# Patient Record
Sex: FEMALE | Race: WHITE | URBAN - METROPOLITAN AREA
[De-identification: names, ages, dates, MRNs, and addresses within clinical notes are randomized per-mention and may not be internally consistent; named-entity substitution may affect disease eponyms.]

---

## 2021-12-01 ENCOUNTER — HISTORICAL (OUTPATIENT)
Dept: ADMINISTRATIVE | Facility: HOSPITAL | Age: 56
End: 2021-12-01

## 2022-04-30 NOTE — ED PROVIDER NOTES
Patient:   Chandrika Corley            MRN: [id number]            FIN: [id number]               Age:   56 years     Sex:  Female     :  1965   Associated Diagnoses:   Acute bronchitis; Diarrhea; Hypokalemia   Author:   Zac Muniz MD      Basic Information   Time seen: Date & time 2021 13:17:00.   History source: Patient.   Arrival mode: Private vehicle, wheelchair.   History limitation: None.   Additional information: Patient's physician(s): Gen Wilder MD, Chief Complaint from Nursing Triage Note : Chief Complaint   2021 13:08 CST      Chief Complaint           POV suspected flu+ from Dr. Cruz, was not actually tested. SOB present, generalized body aches. Some diarrhea present. Sent by Dr. Cruz  .      History of Present Illness   The patient presents with 55 yo female presetns with cough, congestion and bodyaches for the past week. Complains of SOB and diarrhea. Sent by Dr. Wilder. GERTRUDIS Esparza and   I, Dr. Muniz, assumed care of this patient at 1632.    A 56-year-old female with a history of fibromyalgia presents to ED c/o generalized body aches with SOB, cough, congestion, HA, and diarrhea onset over the past week.  at bed side states pt was sent here by Dr. Wilder, he notes symptoms are suspected to be due to the Flu, however a swab was not done. Pt states she is on multiple medications, which include, influenza antibiotics, Tylenol cold/flu, Tessalon Perles, nasal spray, and Delsym. She also reports of worsening back pain. .  The onset was Past week.  The course/duration of symptoms is constant.  Degree at onset moderate.  Degree at present moderate.  The Exacerbating factors is none.  The Relieving factors is none.  Risk factors consist of none.  Prior episodes: none.  Therapy today: Sent by Dr. Liu  and medications: influenza antibiotics, Tylenol cold/flu, Tessalon Perles, nasal spray, and Delsym.  Associated symptoms: cough, back pain,  diarrhea, HA, SOB, body aches, congestion  and denies nausea.  Additional history: none.        Review of Systems   Constitutional symptoms:  Body Aches, no fever, no chills   Skin symptoms:  No rash, no abrasions   Respiratory symptoms:  Shortness of breath, cough, Congestion    Cardiovascular symptoms:  No chest pain, no palpitations, no syncope   Gastrointestinal symptoms:  Diarrhea, no abdominal pain, no nausea, no vomiting.    Genitourinary symptoms:     Musculoskeletal symptoms:  Back pain, no Muscle pain   Neurologic symptoms:  Headache, no dizziness, no altered level of consciousness.              Additional review of systems information: All other systems reviewed and otherwise negative.      Health Status   Allergies:    Allergic Reactions (Selected)  Severity Not Documented  Penicillins- No reactions were documented..   Medications:  (Selected)   Inpatient Medications  Ordered  Cipro I.V. 400 mg/200 mL intravenous solution: 400 mg, form: Infusion, IV Piggyback, Once, Infuse over: 1 hr, first dose 18 13:00:00 CDT, stop date 18 13:00:00 CDT, on call to OR  Documented Medications  Documented  AMPHET/DEXTR TAB 30M mg = 1 tab(s), Oral, BID  Allegra D OTC 24HR 180 mg-240 mg oral tablet, extended release: 0 Refill(s)  D3-50 50,000 UNITS CAPSULE: 1,250 mcg = 1 cap(s), Oral, qWeek  DEPO-ESTRADI INJ 5MG/ML: q4wk  ESCITALOPRAM 20 MG TABLET: 20 mg = 1 tab(s), Oral, Daily  HYDROXYCHLOR TAB 200M mg = 2 tab(s), Oral, Daily  METHOTREXATE 50 MG/2 ML VIAL:   OXYCOD/APAP  TAB 10-325M tab(s), Oral, QID  carisoprodol 350 mg oral tablet: 350 mg = 1 tab(s), Oral, BID  diltiazem 120 mg/24 hours oral CAPsule extended release: 120 mg = 1 cap(s), Oral, At Bedtime  gabapentin 100 mg oral capsule: 100 mg = 1 cap(s), Oral, TID  magnesium oxide 400 mg oral tablet: 400 mg = 1 tab(s), Oral, Daily  potassium chloride 10 mEq oral TABLET extended release: 10 mEq = 1 tab(s), Oral, Daily  zinc (as gluconate) 50 mg  oral tablet: Oral, Daily.      Past Medical/ Family/ Social History   Medical history:   COVID-19   Fibromyalgia   Neuropathy   RA - Rheumatoid arthritis   Skin cancer   .   Surgical history:    RT HAND/FINGER SURGERY- 4 FINGER JOINT FUSION.  LEFT HAND SURGERY- 5 JOINTS FUSED IN FINGERS.  RT FOOT- REMOVAL OF GREAT TOE AND 2ND TOE JOINTS.  RT ACL REPLACEMENT.  REMOVAL OF COLON POLYPS.  Colonoscopy ([id number]).  Hysterectomy ([id number]).  Comments:  7/9/2018 16:06 CDT - Kelsi MAST, Fanny  REMOVAL OF LEFT OVARY  Breast reduction, bilateral ([id number]).  Tonsillectomy ([id number]).  REMOVAL OF RT OVARY.  LEFT EAR DRUM REPLACEMENT..   Family history:    Cancer  Mother  Dementia  Mother  Heart disease  Father  Diabetes mellitus type 2  Father  Hemophilia  Brother  .   Social history: Alcohol use: Denies, Tobacco use: Denies, Drug use: Denies.      Physical Examination               Vital Signs   Vital Signs   12/1/2021 16:01 CST      Peripheral Pulse Rate     94 bpm                             Heart Rate Monitored      94 bpm                             Respiratory Rate          18 br/min                             SpO2                      99 %                             Oxygen Therapy            Room air                             Systolic Blood Pressure   153 mmHg  HI                             Diastolic Blood Pressure  92 mmHg  HI                             Mean Arterial Pressure, Cuff              112 mmHg    12/1/2021 15:40 CST      Oxygen Therapy            Room air    12/1/2021 13:08 CST      Temperature Oral          36.8 DegC                             Temperature Oral (calculated)             98.24 DegF                             Peripheral Pulse Rate     108 bpm  HI                             Respiratory Rate          18 br/min                             SpO2                      96 %                             Oxygen Therapy            Room air                             Systolic Blood Pressure    143 mmHg  HI                             Diastolic Blood Pressure  81 mmHg  .   Basic Oxygen Information   12/1/2021 16:01 CST      SpO2                      99 %                             Oxygen Therapy            Room air    12/1/2021 15:40 CST      Oxygen Therapy            Room air    12/1/2021 13:08 CST      SpO2                      96 %                             Oxygen Therapy            Room air  General:  Alert, mild distress   Neurological:  Alert and oriented to person, place, time, and situation, No focal neurological deficit observed, normal speech observed   Skin:  Warm, dry   Head:  Normocephalic, atraumatic   Eye:     , Nasal congestion. Cardiovascular:  Regular rate and rhythm, Normal peripheral perfusion, No edema   Respiratory:  Lungs are clear to auscultation, respirations are non-labored, breath sounds are equal, Symmetrical chest wall expansion, Cough: Dry.    Musculoskeletal:  No deformity   Gastrointestinal:  Soft, Nontender, Non distended, Normal bowel sounds, Guarding: Negative, Rebound: Negative.    Psychiatric:  Cooperative, appropriate mood & affect      Medical Decision Making   Differential Diagnosis:  Pneumonia, bronchitis, influenza not pulmonary embolism,  not pleural effusion,  not acute myocardial infarction.    Rationale:  Had a very long discussion with the patient and her significant other aregarding her symptoms which are consistent with a viral infection in my opinion.  X-ray negative for infiltrate.  COVID-19 and influenza are both negative.  Recommend symptomatic treatment including medication to help relieve diarrhea.  She is already taking Delsym and has a prescription for Tessalon for the cough.  Also taking Tylenol Cold and flu.  Also taking nasal steroid and daily allergy medication.  I recommend she continue those things.  I had a very long discussion with them about chronic pain medicine apparently she is followed by pain management who recently advised that she  discontinue her Percocet and restart it after a brief abstinence period.  They volunteered this and had a lot of questions about chronic pain medications and I answered their questions to the best of my ability but advised the continue to follow-up with their pain management provider and primary care physician.  I discussed her work-up here treatment plan.  She is comfortable with this. Patient reports she is currently on antibiotics I recommend that she continue them to completion.  Dr. Garcia indicates she is on azithromycin.   Documents reviewed:  Emergency department nurses' notes.   Orders  Launch Order Profile (Selected)   Inpatient Orders  Ordered  Patient Isolation: 21 13:19:49 CST, Contact Precautions, Constant Indicator  Patient Isolation: 21 13:19:49 CST, Droplet Precautions, Constant Indicator  potassium chloride 20 mEq oral TABLET extended release: 40 mEq, form: Tab-ER, Oral, Once, first dose 21 16:50:00 CST, stop date 21 16:50:00 CST, STAT  Completed  Automated Diff: NOW collect, 21 13:27:00 CST, Blood, Collected, Stop date 21 13:27:00 CST, Lab Collect, Print Label By Order Location, 21 13:19:00 CST  CBC w/ Auto Diff: NOW collect, 21 13:27:44 CST, BLOOD, Collected, Stop date 21 13:27:00 CST, Lab Collect  CMP: STAT collect, 21 13:27:44 CST, BLOOD, Collected, Stop date 21 13:27:00 CST, Lab Collect  COVID/FLU A&B PCR: Stat collect, Nasopharyngeal Swab, 21 13:19:00 CST, Stop date 21 13:19:00 CST, Nurse collect  EK21 13:19:00 CST, Stat, Once, 21 13:19:00 CST, -1, -1, 21 13:19:00 CST  Estimated Glomerular Filtration Rate: STAT collect, 21 13:27:00 CST, Blood, Collected, Stop date 21 13:27:00 CST, Lab Collect, Print Label By Order Location, 21 13:19:00 CST  Magnesium Level: STAT collect, 21 13:27:44 CST, BLOOD, Collected, Stop date 21 13:27:00 CST, Lab Collect  Troponin-I: STAT  collect, 12/01/21 13:27:44 CST, BLOOD, Collected, Stop date 12/01/21 13:27:00 CST, Lab Collect  Urinalysis with Reflex: Stat collect, Urine, 12/01/21 13:19:00 CST, Stop date 12/01/21 13:19:00 CST, Nurse collect, Print Label By Order Location  XR Chest 2 Views: Stat, 12/01/21 13:19:00 CST, Chest Pain, None, Stretcher, Rad Type, Not Scheduled, 12/01/21 13:19:00 CST  Deleted  EKG Adult 12 Lead: 12/01/21 13:20:00 CST, Stat, Once, 12/01/21 13:20:00 CST, -1, -1, 12/01/21 13:20:00 CST  Prescriptions  Prescribed  Imodium 2 mg capsule: 2 mg = 1 cap(s), Oral, q4hr, PRN PRN as needed for loose stool, X 2 day(s), # 12 cap(s), 0 Refill(s), Pharmacy: [facility] - [place], [place], 172.7, cm, Height/Length Dosing, 09/15/21 10:03:00 CDT, 83.4, kg, Weight Dosing, 09/...  K-Dur 20 oral tablet, extended release: 20 mEq = 1 tab(s), Oral, BID, # 10 tab(s), 0 Refill(s), Pharmacy: [facility] - [place], [place], 172.7, cm, Height/Length Dosing, 09/15/21 10:03:00 CDT, 83.4, kg, Weight Dosing, 09/15/21 10:03:00 CDT.   Electrocardiogram:  Time 12/1/2021 13:11:00, rate 108, no ectopy, normal LA & QRS intervals, The Rhythm is sinus tachycardia.  .    Results review:  Lab results : Lab View   12/1/2021 14:45 CST      UA Appear                 CLEAR                             UA Color                  DK YELLOW                             UA Spec Grav              1.040  HI                             UA Bili                   Negative                             UA pH                     5.5                             UA Urobilinogen           1.0                             UA Blood                  Negative                             UA Glucose                Negative                             UA Ketones                Trace                             UA Protein                Trace                             UA Nitrite                Negative                             UA Leuk Est                Negative                             UA WBC                    NONE SEEN                             UA RBC                    None Seen                             UA Bacteria               NONE SEEN /HPF                             UA Squam Epithelial       7 /HPF  HI    12/1/2021 13:27 CST      Sodium Lvl                142 mmol/L                             Potassium Lvl             3.0 mmol/L  LOW                             Chloride                  106 mmol/L                             CO2                       23 mmol/L                             Calcium Lvl               9.8 mg/dL                             Magnesium Lvl             1.90 mg/dL                             Glucose Lvl               125 mg/dL  HI                             BUN                       14.8 mg/dL                             Creatinine                1.20 mg/dL  HI                             eGFR-AA                   60  NA                             eGFR-JAKI                  49 mL/min/1.73 m2  NA                             Bili Total                0.7 mg/dL                             Bili Direct               0.2 mg/dL                             Bili Indirect             0.50 mg/dL                             AST                       30 unit/L                             ALT                       21 unit/L                             Alk Phos                  168 unit/L  HI                             Total Protein             7.4 gm/dL                             Albumin Lvl               4.7 gm/dL                             Globulin                  2.7 gm/dL                             A/G Ratio                 1.7 ratio                             Troponin-I                0.010 ng/mL                             WBC                       9.1 x10(3)/mcL                             RBC                       5.32 x10(6)/mcL                             Hgb                       16.5 gm/dL  HI                              Hct                       45.2 %                             Platelet                  355 x10(3)/mcL                             MCV                       85.0 fL                             MCH                       31.0 pg                             MCHC                      36.5 gm/dL  HI                             RDW                       12.9 %                             MPV                       9.8 fL                             Abs Neut                  6.90 x10(3)/mcL                             Neutro Auto               76 %  NA                             Lymph Auto                15 %  NA                             Mono Auto                 6 %  NA                             Eos Auto                  2 %  NA                             Abs Eos                   0.2 x10(3)/mcL                             Basophil Auto             1 %  NA                             Abs Neutro                6.90 x10(3)/mcL                             Abs Lymph                 1.4 x10(3)/mcL                             Abs Mono                  0.6 x10(3)/mcL                             Abs Baso                  0.0 x10(3)/mcL    12/1/2021 13:11 CST      Influ A PCR               Negative                             Influ B PCR               Negative                             SARS-CoV-2 PCR            Not Detected  .   Radiology results:  Rad Results (ST)  < 12 hrs   Accession: [id number]  Order: XR Chest 2 Views  Report Dt/Tm: 12/01/2021 13:53  Report:   EXAMINATION  XR Chest 2 Views     INDICATION  Chest Pain     Comparison: None     FINDINGS  The heart is normal in size. There is no focal airspace consolidation.  There is no pleural effusion or pneumothorax. There are degenerative  changes in the thoracic spine.     IMPRESSION  No acute abnormality of the chest.    .      Impression and Plan   Diagnosis   Acute bronchitis (KVM47-UP J20.9)   Diarrhea (MBK89-FL R19.7)   Hypokalemia (RSK81-NS E87.6)    Plan   Condition: Stable.    Disposition: Medically cleared, Discharged: to home.    Prescriptions: Launch prescriptions   Pharmacy:  Imodium 2 mg capsule (Prescribe): 2 mg = 1 cap(s), Oral, q4hr, PRN PRN as needed for loose stool, X 2 day(s), # 12 cap(s), 0 Refill(s), Pharmacy: [facility] - [place], [place], 172.7, cm, Height/Length Dosing, 9/15/2021 10:03 CDT, 83.4, kg, Weight Dosing, 9/15/2021 10:03 CDT  K-Dur 20 oral tablet, extended release (Prescribe): 20 mEq = 1 tab(s), Oral, BID, X 5 day(s), # 10 tab(s), 0 Refill(s), Pharmacy: [facility] - [place], [place], 172.7, cm, Height/Length Dosing, 9/15/2021 10:03 CDT, 83.4, kg, Weight Dosing, 9/15/2021 10:03 CDT.    Patient was given the following educational materials: Acute Bronchitis, Adult, Hypokalemia, Food Choices to Help Relieve Diarrhea, Adult.    Follow up with: Gen Wilder Within 1 week Call for followup appointment  Return to ED if symptoms worsen.    Counseled: Patient, Regarding diagnosis, Regarding diagnostic results, Regarding treatment plan, Patient indicated understanding of instructions.    Notes: IShakira, acted solely as a scribe for and in the presence of Dr. Muniz who performed this service., I, Dr Muniz have read note from scribe and I agree with history and physical except as amended by me.  All information was dictated from my history and my examination of patient..